# Patient Record
Sex: FEMALE | Race: WHITE
[De-identification: names, ages, dates, MRNs, and addresses within clinical notes are randomized per-mention and may not be internally consistent; named-entity substitution may affect disease eponyms.]

---

## 2018-01-03 ENCOUNTER — HOSPITAL ENCOUNTER (EMERGENCY)
Dept: HOSPITAL 5 - ED | Age: 18
Discharge: HOME | End: 2018-01-03
Payer: MEDICAID

## 2018-01-03 VITALS — SYSTOLIC BLOOD PRESSURE: 123 MMHG | DIASTOLIC BLOOD PRESSURE: 57 MMHG

## 2018-01-03 DIAGNOSIS — O23.12: Primary | ICD-10-CM

## 2018-01-03 DIAGNOSIS — O26.892: ICD-10-CM

## 2018-01-03 DIAGNOSIS — Z3A.27: ICD-10-CM

## 2018-01-03 DIAGNOSIS — M79.605: ICD-10-CM

## 2018-01-03 DIAGNOSIS — N30.00: ICD-10-CM

## 2018-01-03 LAB
BACTERIA #/AREA URNS HPF: (no result) /HPF
BILIRUB UR QL STRIP: (no result)
BLOOD UR QL VISUAL: (no result)
MUCOUS THREADS #/AREA URNS HPF: (no result) /HPF
NITRITE UR QL STRIP: (no result)
PH UR STRIP: 7 [PH] (ref 5–7)
PROT UR STRIP-MCNC: (no result) MG/DL
RBC #/AREA URNS HPF: 3 /HPF (ref 0–6)
UROBILINOGEN UR-MCNC: < 2 MG/DL (ref ?–2)
WBC #/AREA URNS HPF: 16 /HPF (ref 0–6)

## 2018-01-03 PROCEDURE — 99283 EMERGENCY DEPT VISIT LOW MDM: CPT

## 2018-01-03 PROCEDURE — 81001 URINALYSIS AUTO W/SCOPE: CPT

## 2018-01-03 NOTE — EMERGENCY DEPARTMENT REPORT
ED Lower Extremity HPI





- General


Chief Complaint: Extremity Injury, Lower


Stated Complaint: LEFT LEG PAIN


Time Seen by Provider: 18 09:07


Source: patient


Mode of arrival: Ambulatory


Limitations: No Limitations





- History of Present Illness


Initial Comments: 





pt is a 18 y/o  A0,  currently 27 weeks pregnant presents for left lower 

extremity pain actually is left lumbar pain radiating to the lower left 

extremity patient saw PCP last week has another appointment in 2 days  pain 

described as aching and burning radiating from low back to foot with 

intermittent numbness, pt denies dysuria frequency or urgency no fever no 

chills no fall injury or trauma there is no swelling no ecchymosis no posterior 

calf or thigh tenderness 


MD Complaint: leg injury


Onset/Timin


-: month(s)


Type of Injury: other (strain)


Place: home


Severity: moderate


Severity scale (0 -10): 4


Improves With: nothing


Worsens With: movement, other (prolong walking standing )


Context: other (none)


Associated Symptoms: numbness (intermittent), ambulatory.  denies: swelling





- Related Data


 Previous Rx's











 Medication  Instructions  Recorded  Last Taken  Type


 


Ondansetron [Zofran Odt] 4 mg PO Q8HR PRN #14 tab.rapdis 17 Unknown Rx


 


Acetaminophen 650 mg PO QID #60 tablet 18 Unknown Rx


 


Cephalexin [Keflex] 500 mg PO Q12HR #20 cap 18 Unknown Rx











 Allergies











Allergy/AdvReac Type Severity Reaction Status Date / Time


 


No Known Allergies Allergy   Unverified 17 21:44














ED Review of Systems


ROS: 


Stated complaint: LEFT LEG PAIN


Other details as noted in HPI





Constitutional: denies: chills, fever


Eyes: denies: eye pain, eye discharge, vision change


ENT: denies: ear pain, throat pain


Respiratory: denies: cough, shortness of breath, wheezing


Cardiovascular: denies: chest pain, palpitations


Endocrine: no symptoms reported


Gastrointestinal: denies: abdominal pain, nausea, diarrhea


Genitourinary: denies: urgency, dysuria, discharge


Musculoskeletal: back pain.  denies: joint swelling, arthralgia, myalgia


Skin: denies: rash, lesions


Neurological: denies: headache, weakness, paresthesias


Psychiatric: denies: anxiety, depression


Hematological/Lymphatic: denies: easy bleeding, easy bruising





ED Past Medical Hx





- Past Medical History


Previous Medical History?: No





- Surgical History


Past Surgical History?: Yes


Additional Surgical History: tonsilectomy





- Social History


Smoking Status: Never Smoker


Substance Use Type: Prescribed





- Medications


Home Medications: 


 Home Medications











 Medication  Instructions  Recorded  Confirmed  Last Taken  Type


 


Ondansetron [Zofran Odt] 4 mg PO Q8HR PRN #14 tab.rapdis 17  Unknown Rx


 


Acetaminophen 650 mg PO QID #60 tablet 18  Unknown Rx


 


Cephalexin [Keflex] 500 mg PO Q12HR #20 cap 18  Unknown Rx














ED Physical Exam





- General


Limitations: No Limitations


General appearance: alert, in no apparent distress





- Head


Head exam: Present: atraumatic, normocephalic





- Eye


Eye exam: Present: normal appearance





- ENT


ENT exam: Present: mucous membranes moist





- Neck


Neck exam: Present: normal inspection





- Respiratory


Respiratory exam: Present: normal lung sounds bilaterally.  Absent: respiratory 

distress





- Cardiovascular


Cardiovascular Exam: Present: regular rate, normal rhythm.  Absent: systolic 

murmur, diastolic murmur, rubs, gallop





- GI/Abdominal


GI/Abdominal exam: Present: soft, normal bowel sounds





- Rectal


Rectal exam: Present: deferred





- Extremities Exam


Extremities exam: Present: normal inspection





- Expanded Lower Extremity Exam


  ** Left


Hip exam: Present: normal inspection, full ROM


Upper Leg exam: Present: normal inspection, full ROM.  Absent: tenderness, 

swelling, ecchymosis, deformity, crepidus, erythema


Knee exam: Present: normal inspection, full ROM


Lower Leg exam: Present: normal inspection, full ROM.  Absent: tenderness, 

swelling, abrasion, laceration, ecchymosis, deformity, crepidus, dislocation, 

erythema, palpable cord, Gerry's sign


Ankle exam: Present: normal inspection, full ROM


Foot/Toe exam: Present: normal inspection, full ROM


Neuro vascular tendon exam: Present: no vascular compromise.  Absent: pulse 

deficit, abnormal cap refill, motor deficit, sensory deficit, tendon deficit, 

extremity cold to touch, pallor, abnormal 2-point discrimination, decreased fine

/light touch, foot drop, peroneal nerve deficit, significant pain with passive 

ROM of distal joint


Gait: Positive: observed and normal





- Back Exam


Back exam: Present: normal inspection, full ROM.  Absent: tenderness, CVA 

tenderness (R), CVA tenderness (L), muscle spasm, paraspinal tenderness, 

vertebral tenderness, rash noted





- Neurological Exam


Neurological exam: Present: alert, oriented X3





- Psychiatric


Psychiatric exam: Present: normal affect





- Skin


Skin exam: Present: warm, dry, intact, normal color.  Absent: rash





ED Course


 Vital Signs











  18





  07:25


 


Temperature 97.5 F L


 


Pulse Rate 105 H


 


Respiratory 16





Rate 


 


Blood Pressure 123/57


 


O2 Sat by Pulse 96





Oximetry 














ED Lower Extremity MDM





- Lab Data








 Laboratory Tests











  18





  09:56


 


Urine Color  Straw


 


Urine Turbidity  Cloudy


 


Urine pH  7.0


 


Ur Specific Gravity  1.014


 


Urine Protein  <15 mg/dl


 


Urine Glucose (UA)  Neg


 


Urine Ketones  Neg


 


Urine Blood  Sm


 


Urine Nitrite  Neg


 


Urine Bilirubin  Neg


 


Urine Urobilinogen  < 2.0


 


Ur Leukocyte Esterase  Neg


 


Urine WBC (Auto)  16.0 H


 


Urine RBC (Auto)  3.0


 


U Epithel Cells (Auto)  22.0 H


 


Urine Bacteria (Auto)  2+


 


Urine Mucus  1+














- Medical Decision Making


pt is a 18 y/o  A0,  currently 27 weeks pregnant presents for left lower 

extremity pain actually is left lumbar pain radiating to the lower left 

extremity patient saw PCP last week has another appointment in 2 days  pain 

described as aching and burning radiating from low back to foot with 

intermittent numbness, pt denies dysuria frequency or urgency no fever no 

chills no fall injury or trauma there is no swelling no ecchymosis no posterior 

calf or thigh tenderness exam: no back pain no posterior vertebral point 

tenderness no paraspinus muscle tenderness  rom intact pos straight leg left, 

no calf tenderness no calf tenderness negative homans sign, no pt remains 

ambulatory to baseline this is not likely a DVT there is fever no ecchymosis no 

swelling, negative homans, PPEP+2, CRT +2 bilat, , pain is improved with 

tylenol , ua:   plan follow up with OBGYN in 2 days as scheduled , tylenol prn 

pain , return to ed if symptoms worsen. 





Critical care attestation.: 


If time is entered above; I have spent that time in minutes in the direct care 

of this critically ill patient, excluding procedure time.








ED Disposition


Clinical Impression: 


 Musculoskeletal pain of left lower extremity





UTI (urinary tract infection)


Qualifiers:


 Urinary tract infection type: acute cystitis Hematuria presence: without 

hematuria Qualified Code(s): N30.00 - Acute cystitis without hematuria





Disposition: DC-01 TO HOME OR SELFCARE


Is pt being admited?: No


Does the pt Need Aspirin: No


Condition: Good


Instructions:  Musculoskeletal Pain (ED), Urinary Tract Infection in Women (ED)


Prescriptions: 


Acetaminophen 650 mg PO QID #60 tablet


Cephalexin [Keflex] 500 mg PO Q12HR #20 cap


Referrals: 


JADEN OG MD [Primary Care Provider] - 3-5 Days


Forms:  Work/School Release Form(ED)


Time of Disposition: 10:45